# Patient Record
Sex: MALE | Race: WHITE | NOT HISPANIC OR LATINO
[De-identification: names, ages, dates, MRNs, and addresses within clinical notes are randomized per-mention and may not be internally consistent; named-entity substitution may affect disease eponyms.]

---

## 2019-03-18 PROBLEM — Z00.00 ENCOUNTER FOR PREVENTIVE HEALTH EXAMINATION: Status: ACTIVE | Noted: 2019-03-18

## 2019-03-22 ENCOUNTER — APPOINTMENT (OUTPATIENT)
Dept: GASTROENTEROLOGY | Facility: CLINIC | Age: 49
End: 2019-03-22
Payer: COMMERCIAL

## 2019-03-22 VITALS
WEIGHT: 165 LBS | BODY MASS INDEX: 26.52 KG/M2 | HEART RATE: 72 BPM | SYSTOLIC BLOOD PRESSURE: 110 MMHG | DIASTOLIC BLOOD PRESSURE: 90 MMHG | HEIGHT: 66 IN

## 2019-03-22 DIAGNOSIS — K21.9 GASTRO-ESOPHAGEAL REFLUX DISEASE W/OUT ESOPHAGITIS: ICD-10-CM

## 2019-03-22 DIAGNOSIS — Z12.11 ENCOUNTER FOR SCREENING FOR MALIGNANT NEOPLASM OF COLON: ICD-10-CM

## 2019-03-22 PROCEDURE — 99243 OFF/OP CNSLTJ NEW/EST LOW 30: CPT

## 2019-03-22 NOTE — ASSESSMENT
[FreeTextEntry1] : 1) Colon screening - advised that ACS recommends starting colon screening at 45, while  USPSTF rec start at 50 - he will check with his insurance RE: coverage. \par \par 2) GERD - Reviewed dietary and lifestyle modifications, including weight loss, smaller/frequent/earlier (>3h prior to lying down) meals, trials of cutting down/out caffeine, chocolate, tomatoes, fatty foods, alcohol.  He has LPR manifestations and was recommended to see ENT.  EGD would be reasonable in light of his longstanding sx and severity - we will move toward scheduling - but he will check w/ ins re: colonoscopy coverage - and possibly do both concurrently.  \par \par Pt will call after he checks w/ insurance\par

## 2019-03-22 NOTE — CONSULT LETTER
[Dear  ___] : Dear  [unfilled], [Consult Letter:] : I had the pleasure of evaluating your patient, [unfilled]. [FreeTextEntry1] : Thank you very much for allowing me to participate in the care of this patient.  If you have any questions, please do not hesitate to contact me.\par \par Sincerely, \par \par Stephen Klein MD\par

## 2019-03-22 NOTE — HISTORY OF PRESENT ILLNESS
[FreeTextEntry1] : 48m PMH diverticulitis w/ temp colostomy / reversal '08 (preop colonoscopy w/ diverticulosis only), presenting for colon cancer screening. Pt denies abdominal pain, rectal bleeding, change in bowel habits, or unexplained weight loss.  \par \par He also relates a sensation of chronic throat clearing w/ some globus.  He also takes chronic antacids for heartburn but has no sensation of food sticking.  No melena.  No wt loss.  Sx daily off/on unless takes antacids x>10years\par \par Soc:  no tobacco or significant EtOH\par FHx: GF - crc\par \par ROS:\par Constitutional:: no weight loss, fevers\par ENT: no deafness\par Eyes: not blind\par Neck: no LN\par Chest: no dyspnea/cough\par Cardiac: no chest pain\par Vascular: no leg swelling\par GI: no abdominal pain, nausea, vomiting, diarrhea, constipation, rectal bleeding, dysphagia, melena unless otherwise noted in HPI\par : no dysuria, dark urine\par Skin: no rashes, jaundice\par Heme: no bleeding\par \par Px: (VS noted below)\par General: NAD\par Eyes: anicteric\par Oropharynx:  clear\par Neck: no LN\par Chest: normal respiratory effort\par CVS: regular\par Abd: soft, NT, ND, +BS, no HSM\par Ext: no atrophy\par Neuro: grossly nonfocal\par

## 2021-06-17 ENCOUNTER — HOSPITAL ENCOUNTER (OUTPATIENT)
Dept: HOSPITAL 74 - FASU-ENDO | Age: 51
Discharge: HOME | End: 2021-06-17
Attending: INTERNAL MEDICINE
Payer: COMMERCIAL

## 2021-06-17 VITALS — DIASTOLIC BLOOD PRESSURE: 91 MMHG | SYSTOLIC BLOOD PRESSURE: 166 MMHG

## 2021-06-17 VITALS — HEART RATE: 79 BPM | TEMPERATURE: 97.6 F

## 2021-06-17 VITALS — BODY MASS INDEX: 25.8 KG/M2

## 2021-06-17 DIAGNOSIS — K22.2: Primary | ICD-10-CM

## 2021-06-17 DIAGNOSIS — K29.70: ICD-10-CM

## 2021-06-17 DIAGNOSIS — K21.00: ICD-10-CM

## 2021-06-17 DIAGNOSIS — K31.9: ICD-10-CM

## 2021-06-17 PROCEDURE — 0D728ZZ DILATION OF MIDDLE ESOPHAGUS, VIA NATURAL OR ARTIFICIAL OPENING ENDOSCOPIC: ICD-10-PCS | Performed by: INTERNAL MEDICINE

## 2021-06-17 PROCEDURE — 0DB68ZX EXCISION OF STOMACH, VIA NATURAL OR ARTIFICIAL OPENING ENDOSCOPIC, DIAGNOSTIC: ICD-10-PCS | Performed by: INTERNAL MEDICINE

## 2021-06-17 PROCEDURE — 0DB98ZX EXCISION OF DUODENUM, VIA NATURAL OR ARTIFICIAL OPENING ENDOSCOPIC, DIAGNOSTIC: ICD-10-PCS | Performed by: INTERNAL MEDICINE

## 2021-06-17 PROCEDURE — 0D738ZZ DILATION OF LOWER ESOPHAGUS, VIA NATURAL OR ARTIFICIAL OPENING ENDOSCOPIC: ICD-10-PCS | Performed by: INTERNAL MEDICINE

## 2022-08-16 ENCOUNTER — NON-APPOINTMENT (OUTPATIENT)
Age: 52
End: 2022-08-16

## 2022-08-23 ENCOUNTER — APPOINTMENT (OUTPATIENT)
Dept: SURGERY | Facility: CLINIC | Age: 52
End: 2022-08-23

## 2022-08-23 ENCOUNTER — NON-APPOINTMENT (OUTPATIENT)
Age: 52
End: 2022-08-23

## 2022-08-23 VITALS
BODY MASS INDEX: 27.19 KG/M2 | SYSTOLIC BLOOD PRESSURE: 149 MMHG | HEART RATE: 68 BPM | WEIGHT: 169.2 LBS | DIASTOLIC BLOOD PRESSURE: 87 MMHG | HEIGHT: 66 IN | OXYGEN SATURATION: 97 % | TEMPERATURE: 98 F

## 2022-08-23 DIAGNOSIS — Z80.1 FAMILY HISTORY OF MALIGNANT NEOPLASM OF TRACHEA, BRONCHUS AND LUNG: ICD-10-CM

## 2022-08-23 DIAGNOSIS — J45.909 UNSPECIFIED ASTHMA, UNCOMPLICATED: ICD-10-CM

## 2022-08-23 DIAGNOSIS — K57.20 DIVERTICULITIS OF LARGE INTESTINE WITH PERFORATION AND ABSCESS W/OUT BLEEDING: ICD-10-CM

## 2022-08-23 DIAGNOSIS — F17.290 NICOTINE DEPENDENCE, OTHER TOBACCO PRODUCT, UNCOMPLICATED: ICD-10-CM

## 2022-08-23 DIAGNOSIS — Z72.89 OTHER PROBLEMS RELATED TO LIFESTYLE: ICD-10-CM

## 2022-08-23 DIAGNOSIS — I10 ESSENTIAL (PRIMARY) HYPERTENSION: ICD-10-CM

## 2022-08-23 DIAGNOSIS — E78.5 HYPERLIPIDEMIA, UNSPECIFIED: ICD-10-CM

## 2022-08-23 DIAGNOSIS — Z82.49 FAMILY HISTORY OF ISCHEMIC HEART DISEASE AND OTHER DISEASES OF THE CIRCULATORY SYSTEM: ICD-10-CM

## 2022-08-23 PROCEDURE — 99203 OFFICE O/P NEW LOW 30 MIN: CPT

## 2022-08-23 RX ORDER — FLUTICASONE PROPIONATE AND SALMETEROL 50; 100 UG/1; UG/1
100-50 POWDER RESPIRATORY (INHALATION)
Refills: 0 | Status: ACTIVE | COMMUNITY

## 2022-08-23 RX ORDER — RAMIPRIL 10 MG/1
10 CAPSULE ORAL
Refills: 0 | Status: ACTIVE | COMMUNITY

## 2022-08-23 RX ORDER — PANTOPRAZOLE 40 MG/1
40 TABLET, DELAYED RELEASE ORAL
Refills: 0 | Status: ACTIVE | COMMUNITY

## 2022-08-23 RX ORDER — AMLODIPINE BESYLATE 10 MG/1
10 TABLET ORAL
Refills: 0 | Status: ACTIVE | COMMUNITY

## 2022-08-23 RX ORDER — ATORVASTATIN CALCIUM 10 MG/1
10 TABLET, FILM COATED ORAL
Refills: 0 | Status: ACTIVE | COMMUNITY

## 2022-08-23 NOTE — ASSESSMENT
[FreeTextEntry1] : Referring physician Dr. Clinton Bell\par \par Date 8/23/2022\par \par Reason for referral soft tissue mass right buttock\par \par This is a 52-year-old gentleman with over a 20 to 25-year history of having a mass in his right buttock.  The mass never bothered him and therefore was not addressed.  Approximately a month ago he was sitting down and had a sharp pain in the area and therefore decided to have this now addressed.  Patient states there is no change in size of this mass clinically however he did obtain MRI and according to the patient's MR he said that the mass is gotten a little bit bigger.\par \par He denies change in bowel habits nausea or vomiting.  He has had no fever chills or sweats.  The mass does not hurt unless being pushed on.\par \par Physical examination: Patient examined erect and prone position.  He has a mass overlying the right buttock that is visually noticeable when he lays down.  The mass is very well demarcated approximately size of a golf ball and mobile.  It is fixed slightly posteriorly.  Clinically this does not have the impression that the mass is behind the gluteus muscle and this is all subcutaneous the mass is firm in nature.  There is no tenderness on examination.\par \par Impression/plan soft tissue mass right buttock: This is a 52-year-old gentleman with a 20 to 25-year history of having a soft tissue mass on his buttock.  The patient was fine up until a month ago when he developed pain in the area while sitting and therefore he wished to have this removed.  The patient myself had a long conversation regarding removal of this mass and whether or not this will resolve his pain.  He understands sometimes removing the mass does not necessarily resolve the pain which may be related to another issue.  He understands this and still wants to have this mass removed.\par \par With regard to the MR finding the MR stated that this mass is behind his gluteus muscle and based on the physical examination today this mass clearly appears to be subcutaneous and not behind the muscle therefore I am not sure what the MRI is reading nor looking at but this mass is clearly palpable on examination and this is the one that is bothering in his gentleman and therefore this mass will be addressed whether this truly addresses the MR finding or not is not relevant in this scenario since MRI demonstrated no suspicious findings.  The patient understands this and just wants to have this palpable finding addressed.\par \par The indications alternatives and complication of procedure discussed.  Questions answered.  Written consent obtained in the office today.

## 2022-09-16 ENCOUNTER — RESULT REVIEW (OUTPATIENT)
Age: 52
End: 2022-09-16

## 2022-09-18 ENCOUNTER — RESULT REVIEW (OUTPATIENT)
Age: 52
End: 2022-09-18

## 2022-09-19 ENCOUNTER — TRANSCRIPTION ENCOUNTER (OUTPATIENT)
Age: 52
End: 2022-09-19

## 2022-09-19 ENCOUNTER — APPOINTMENT (OUTPATIENT)
Dept: SURGERY | Facility: HOSPITAL | Age: 52
End: 2022-09-19

## 2022-09-20 ENCOUNTER — NON-APPOINTMENT (OUTPATIENT)
Age: 52
End: 2022-09-20

## 2022-09-22 ENCOUNTER — APPOINTMENT (OUTPATIENT)
Dept: SURGERY | Facility: CLINIC | Age: 52
End: 2022-09-22

## 2022-09-22 VITALS
SYSTOLIC BLOOD PRESSURE: 149 MMHG | HEART RATE: 81 BPM | TEMPERATURE: 98.5 F | DIASTOLIC BLOOD PRESSURE: 104 MMHG | OXYGEN SATURATION: 97 %

## 2022-09-22 PROCEDURE — 99024 POSTOP FOLLOW-UP VISIT: CPT

## 2022-09-22 NOTE — PLAN
[FreeTextEntry1] : Patient is stable.  He has already started to shower and will con't to do so.  As per our previous telephone conversation and as per Dr. Fox, the patient will not cover the incision with a bandage unless he decides otherwise for comfort.  Con't drain care as previously instructed.  He will call with any problems, questions or concerns.  He will keep his next post op appt already scheduled for 09/28/22 with Dr. Lujan for KELSI removal and possibly suture removal.

## 2022-09-22 NOTE — PHYSICAL EXAM
[de-identified] : Right Buttock - Incision is clean, dry and healing well.  No signs of infection.  No drainage.  Sutures and dermabond in place.  (+) Ecchymosis noted above and below incision.  KELSI in place and is draining serosanguineous fluid.  Tube stripped and noted to drain well into bulb.  2x2 gauze placed over KELSI incision site then covered with 3 Tegaderm film dressings.

## 2022-09-22 NOTE — HISTORY OF PRESENT ILLNESS
[de-identified] : Patient presents today, s/p excision of right buttock epidermal inclusion cyst done on 09/19/22 by Dr. Fox, for replacement of bandage over KELSI site.  Patient c/o expected post op discomfort at the incision.  Denies drainage from incision.  KELSI draining approx less than 10-20cc/day.

## 2022-09-28 ENCOUNTER — APPOINTMENT (OUTPATIENT)
Dept: SURGERY | Facility: CLINIC | Age: 52
End: 2022-09-28

## 2022-09-28 VITALS
TEMPERATURE: 98.2 F | DIASTOLIC BLOOD PRESSURE: 78 MMHG | OXYGEN SATURATION: 97 % | HEART RATE: 70 BPM | SYSTOLIC BLOOD PRESSURE: 127 MMHG

## 2022-09-28 PROCEDURE — 99024 POSTOP FOLLOW-UP VISIT: CPT

## 2022-09-28 NOTE — PLAN
[FreeTextEntry1] : Patient is 9 days status post excision of a soft tissue mass from his buttock.  Pathology confirms that it is a benign epidermal inclusion cyst.  His Keith-Mendoza drain has minimal output and is removed at this time.  His Prolene sutures are intact.  There is no sign of infection.\par \par Plan: The patient will follow next week to be evaluated for suture removal.

## 2022-10-04 ENCOUNTER — APPOINTMENT (OUTPATIENT)
Dept: SURGERY | Facility: CLINIC | Age: 52
End: 2022-10-04

## 2022-10-04 VITALS — SYSTOLIC BLOOD PRESSURE: 134 MMHG | OXYGEN SATURATION: 98 % | DIASTOLIC BLOOD PRESSURE: 84 MMHG | HEART RATE: 75 BPM

## 2022-10-04 DIAGNOSIS — L72.0 EPIDERMAL CYST: ICD-10-CM

## 2022-10-04 PROCEDURE — 99024 POSTOP FOLLOW-UP VISIT: CPT

## 2022-10-04 NOTE — ASSESSMENT
[FreeTextEntry1] : Patient presents today for follow-up after excision of buttock mass.  Pathology reviewed with patient consistent with a large epidermal inclusion cyst.\par \par Incisions healed nicely no evidence of infection hematoma seroma or ecchymosis\par \par Status post excision of buttock mass patient doing well surgically path reviewed sutures removed.  Patient to return on a as needed basis

## 2024-08-29 ENCOUNTER — HOSPITAL ENCOUNTER (OUTPATIENT)
Dept: HOSPITAL 74 - FASU-ENDO | Age: 54
Discharge: HOME | End: 2024-08-29
Attending: INTERNAL MEDICINE
Payer: COMMERCIAL

## 2024-08-29 VITALS — BODY MASS INDEX: 25.8 KG/M2

## 2024-08-29 VITALS — TEMPERATURE: 97.7 F

## 2024-08-29 VITALS — HEART RATE: 78 BPM | RESPIRATION RATE: 20 BRPM | SYSTOLIC BLOOD PRESSURE: 115 MMHG | DIASTOLIC BLOOD PRESSURE: 81 MMHG

## 2024-08-29 DIAGNOSIS — Z98.0: ICD-10-CM

## 2024-08-29 DIAGNOSIS — Z12.11: Primary | ICD-10-CM

## 2024-08-29 DIAGNOSIS — K57.30: ICD-10-CM

## 2024-08-29 PROCEDURE — 0DJD8ZZ INSPECTION OF LOWER INTESTINAL TRACT, VIA NATURAL OR ARTIFICIAL OPENING ENDOSCOPIC: ICD-10-PCS | Performed by: INTERNAL MEDICINE
